# Patient Record
Sex: FEMALE | Race: WHITE
[De-identification: names, ages, dates, MRNs, and addresses within clinical notes are randomized per-mention and may not be internally consistent; named-entity substitution may affect disease eponyms.]

---

## 2023-03-08 ENCOUNTER — HOSPITAL ENCOUNTER (OUTPATIENT)
Dept: HOSPITAL 97 - OR | Age: 83
Setting detail: OBSERVATION
LOS: 1 days | Discharge: HOME HEALTH | End: 2023-03-09
Attending: ORTHOPAEDIC SURGERY | Admitting: ORTHOPAEDIC SURGERY
Payer: MEDICARE

## 2023-03-08 VITALS — BODY MASS INDEX: 35.1 KG/M2

## 2023-03-08 DIAGNOSIS — M17.12: Primary | ICD-10-CM

## 2023-03-08 DIAGNOSIS — M25.562: ICD-10-CM

## 2023-03-08 DIAGNOSIS — Z20.822: ICD-10-CM

## 2023-03-08 LAB — HCT VFR BLD CALC: 45.4 % (ref 36–45)

## 2023-03-08 PROCEDURE — 97110 THERAPEUTIC EXERCISES: CPT

## 2023-03-08 PROCEDURE — 36415 COLL VENOUS BLD VENIPUNCTURE: CPT

## 2023-03-08 PROCEDURE — 88305 TISSUE EXAM BY PATHOLOGIST: CPT

## 2023-03-08 PROCEDURE — 0SRD069 REPLACEMENT OF LEFT KNEE JOINT WITH OXIDIZED ZIRCONIUM ON POLYETHYLENE SYNTHETIC SUBSTITUTE, CEMENTED, OPEN APPROACH: ICD-10-PCS

## 2023-03-08 PROCEDURE — 97161 PT EVAL LOW COMPLEX 20 MIN: CPT

## 2023-03-08 PROCEDURE — 27447 TOTAL KNEE ARTHROPLASTY: CPT

## 2023-03-08 PROCEDURE — 88311 DECALCIFY TISSUE: CPT

## 2023-03-08 PROCEDURE — 97139 UNLISTED THERAPEUTIC PX: CPT

## 2023-03-08 PROCEDURE — 85018 HEMOGLOBIN: CPT

## 2023-03-08 PROCEDURE — 94010 BREATHING CAPACITY TEST: CPT

## 2023-03-08 PROCEDURE — 97530 THERAPEUTIC ACTIVITIES: CPT

## 2023-03-08 PROCEDURE — 87811 SARS-COV-2 COVID19 W/OPTIC: CPT

## 2023-03-08 PROCEDURE — 85014 HEMATOCRIT: CPT

## 2023-03-08 PROCEDURE — 97116 GAIT TRAINING THERAPY: CPT

## 2023-03-08 PROCEDURE — 73560 X-RAY EXAM OF KNEE 1 OR 2: CPT

## 2023-03-08 RX ADMIN — TRAMADOL HYDROCHLORIDE PRN MG: 50 TABLET, COATED ORAL at 22:18

## 2023-03-08 RX ADMIN — CEFAZOLIN SCH MLS: 2 INJECTION, POWDER, FOR SOLUTION INTRAMUSCULAR; INTRAVENOUS at 22:00

## 2023-03-08 RX ADMIN — Medication SCH: at 21:00

## 2023-03-08 RX ADMIN — CEFAZOLIN SCH MLS: 2 INJECTION, POWDER, FOR SOLUTION INTRAMUSCULAR; INTRAVENOUS at 14:20

## 2023-03-08 RX ADMIN — GABAPENTIN SCH MG: 300 CAPSULE ORAL at 22:00

## 2023-03-08 NOTE — RAD REPORT
EXAM DESCRIPTION:  RAD - Knee Left 2 View - 3/8/2023 11:05 am

 

CLINICAL HISTORY:  Post Op

 

COMPARISON:  Knee Left 3 View dated 8/6/2018

 

FINDINGS:  Postoperative changes of a left total knee arthroplasty is noted. Skin staples are noted a
nteriorly. No unexpected immediate postoperative finding.

## 2023-03-08 NOTE — P.BOP
Preoperative diagnosis: left knee osteoarthritis


Postoperative diagnosis: same


Primary procedure: left total knee arthroplasty


Assistant: NONE,NONE


Estimated blood loss: 50 cc


Specimen: left knee bone remnants


Findings: see dictation


Anesthesia: General


Complications: None


Implants: [employer] Rosi Person[employer] 7 CR femur, E tibia w/ stem, 10 CR poly, 29 

patella


Fluids & blood products: per anesthesia record; TT: 62 mins @ 300 mmHg


Transferred to: Recovery Room


Condition: Good

## 2023-03-08 NOTE — XMS REPORT
Continuity of Care Document

                            Created on:2023



Patient:ISABELLE CANO

Sex:Female

:1940

External Reference #:[id number]





Demographics







                          Address                   [address] [place] [address]

 

                          Home Phone                [phone]

 

                          Mobile Phone              [phone]

 

                          Email Address             [address]

 

                          Preferred Language        English

 

                          Marital Status            Unknown

 

                          Adventism Affiliation     Unknown

 

                          Race                      Unknown

 

                          Additional Race(s)        Unavailable



                                                    White

 

                          Ethnic Group              Unknown









Author







                          Organization              [facility]

t

 

                          Address                   [address] [place] [address] [address]



                                                    [place] [address]

 

                          Phone                     [phone]









Support







                Name            Relationship    Address         Phone

 

                Jacquelyn Kong Child           Unavailable     [phone]

[phone]

 

                Mansi CRUM  Grandchild      Unavailable     [phone]









Care Team Providers







                    Name                Role                Phone

 

                    LOLIS FU Primary Care Physician Unavailable

 

                    Iyanoye_S           Attending Clinician Unavailable

 

                    Osmin_EVAN          Attending Clinician Unavailable

 

                    KEYANA GAONA   Attending Clinician Unavailable

 

                    Keyana Gaona MD Attending Clinician [phone]

 

                    Jessica Bennett   Attending Clinician [phone]

 

                    CAMILLA KLEIN     Attending Clinician Unavailable

 

                    Keisha Herrera  Attending Clinician [phone]

 

                    Car Sharma MD Attending Clinician [phone]

 

                    CAR SHARMA   Attending Clinician Unavailable

 

                    Doctor Unassigned, No Name Attending Clinician Unavailable

 

                    KEISHA LESLIE      Attending Clinician Unavailable

 

                    Iyanoye_S           Admitting Clinician Unavailable

 

                    Andreas          Admitting Clinician Unavailable

 

                    KEYANA GAONA   Admitting Clinician Unavailable

 

                    CAR SHARMA   Admitting Clinician Unavailable









Payers







           Payer Name Policy Type Policy Number Effective Date Expiration Date S

vince

 

           [facility] hospital            [id number]     2022            



           (MEDICARE                        00:00:00              



           REPLACEMENT HMO)                                             

 

           [facility] Move In History            ZKY     2022            



           MEDICARE ADVANTAGE                       00:00:00              



           PLAN                                                   

 

           [employer] MEDICARE ADV            [id number] 2020            



                                            00:00:00              







Problems







       Condition Condition Condition Status Onset  Resolution Last   Treating Co

mments 

Source



       Name   Details Category        Date   Date   Treatment Clinician        



                                                 Date                 

 

       Low back Low back Disease Active                              Unive

rs



       pain   pain                 4-12                               ity of



                                   00:00:                             [address]







Allergies, Adverse Reactions, Alerts







       Allergy Allergy Status Severity Reaction(s) Onset  Inactive Treating Comm

ents 

Source



       Name   Type                        Date   Date   Clinician        

 

       NO KNOWN Drug   Active                                           [facility]



       ALLERGIE Class                                                   ity of



       S                                                              [facility]







Social History







           Social Habit Start Date Stop Date  Quantity   Comments   Source

 

           Exposure to 2022-10-23 2022 Not sure              [facility]-CoV-2 00:00:00   13:38:00                         [facility]



           (event)                                                Fine

 

           Alcohol intake 2022 0 /d                  [facility]



                      00:00:00   00:00:00                         [facility]

 

           Tobacco use and 2017 Smokeless tobacco            Un

iversity of



           exposure   00:00:00   00:00:00   non-user              [facility]

 

           Sex Assigned At 1940                       [facility]

y of



           Birth      00:00:00   00:00:00                         [facility]









                Smoking Status  Start Date      Stop Date       Source

 

                Never smoked tobacco                                 [facility]







Medications







       Ordered Filled Start  Stop   Current Ordering Indication Dosage Frequency

 Signature

                    Comments            Components          Source



     Medication Medication Date Date Medication? Clinician                (SIG) 

          



     Name Name                                                   

 

     iopamidol      2022- No        [id number] 74mL      74 mL,           U

nivers



     (ISOVUE                                Intravenou           ity o

f



     370-500 mL)      21:00: 21:00                          s, ONCE, 1          

 [place]



     injection      00   :00                           dose, On           Medica

l



     74 mL                                         Wed            [facility]



                                                  22 at           



                                                  1600,           



                                                  Routine           

 

     NaCl 0.9%      2022- No             1000mL      at 999           Uni

vers



     (NS) bolus                                mL/hr,           ity of



     infusion      19:45: 21:01                          1,000 mL,           [facility]

as



     1,000 mL      00   :00                           IV             Medical



                                                  Infusion,           [facility]



                                                  ONCE, 1           



                                                  dose, On           



                                                  Wed            



                                                  22 at           



                                                  1445, ASAP           

 

     allopurinol      2019      Yes                      TK 1 T PO           U

nivers



     100 mg      1-05                               D FOR HIGH           ity of



     tablet      00:00:                               URIC ACID           [address]

 

     allopurinol      2019      Yes                      TK 1 T PO           U

nivers



     100 mg      1-05                               D FOR HIGH           ity of



     tablet      00:00:                               URIC ACID           [place]



                                                [facility]

 

     allopurinol      2019      Yes                      TK 1 T PO           U

nivers



     100 mg      1-05                               D FOR HIGH           ity of



     tablet      00:00:                               URIC ACID           [place]



               00                                 [facility]

 

     allopurinol      2019      Yes                      TK 1 T PO           U

nivers



     100 mg      1-05                               D FOR HIGH           ity of



     tablet      00:00:                               URIC ACID           [place]



                                                [facility]

 

     allopurinol      2019      Yes                      TK 1 T PO           U

nivers



     100 mg      1-05                               D FOR HIGH           ity of



     tablet      00:00:                               URIC ACID           [place]



                                                [facility]

 

     allopurinol      2019      Yes                      TK 1 T PO           U

nivers



     100 mg      1-05                               D FOR HIGH           ity of



     tablet      00:00:                               URIC ACID           [place]



                                                [facility]

 

     allopurinol      2019      Yes                      TK 1 T PO           U

nivers



     100 mg      1-05                               D FOR HIGH           ity of



     tablet      00:00:                               URIC ACID           [place]



                                                [facility]

 

     allopurinol      2019      Yes                      TK 1 T PO           U

nivers



     100 mg      1-05                               D FOR HIGH           ity of



     tablet      00:00:                               URIC ACID           [place]



                                                [facility]

 

     allopurinol      2019      Yes                      TK 1 T PO           U

nivers



     100 mg      1-05                               D FOR HIGH           ity of



     tablet      00:00:                               URIC ACID           [place]



                                                [facility]

 

     Pantoprazol            Yes            40mg      Take 40 mg           

Univers



     e 40 mg      7-17                               by mouth           ity of



     delayed-rel      19:34:                               daily.           [place]

s



     ease      40                                                Medical



     suspension                                                        [facility]

 

     gabapentin            Yes                      Take by           [facility]

ers



      mg      7-17                               mouth           ity of



     tablet,      19:34:                               daily.           [place]



     extended      40                                                Medical



     release 24                                                        [facility]



     hr                                                          

 

     Pantoprazol            Yes            40mg      Take 40 mg           

[facility]



     e 40 mg      7-17                               by mouth           ity of



     delayed-rel      19:34:                               daily.           [place]

s



     ease      40                                                Medical



     suspension                                                        [facility]

 

     gabapentin            Yes                      Take by           [facility]

ers



      mg      7-17                               mouth           ity of



     tablet,      19:34:                               daily.           [place]



     extended      40                                                Medical



     release 24                                                        [facility]



     hr                                                          

 

     Pantoprazol            Yes            40mg      Take 40 mg           

[facility]



     e 40 mg      7-17                               by mouth           ity of



     delayed-rel      19:34:                               daily.           [facility]

s



     ease      40                                                Medical



     suspension                                                        [facility]

 

     gabapentin            Yes                      Take by           [facility]

ers



      mg      7-17                               mouth           ity of



     tablet,      19:34:                               daily.           [place]



     extended      40                                                Medical



     release 24                                                        [facility]



     hr                                                          

 

     Pantoprazol            Yes            40mg      Take 40 mg           

[facility]



     e 40 mg      7-17                               by mouth           ity of



     delayed-rel      19:34:                               daily.           [place]

s



     ease      40                                                Medical



     suspension                                                        [facility]

 

     gabapentin            Yes                      Take by           [facility]

ers



      mg      7-17                               mouth           ity of



     tablet,      19:34:                               daily.           [place]



     extended      40                                                Medical



     release 24                                                        [facility]



     hr                                                          

 

     Pantoprazol            Yes            40mg      Take 40 mg           

[facility]



     e 40 mg      7-17                               by mouth           ity of



     delayed-rel      19:34:                               daily.           [place]

s



     ease      40                                                Medical



     suspension                                                        [facility]

 

     gabapentin            Yes                      Take by           Univ

ers



      mg      7-17                               mouth           ity of



     tablet,      19:34:                               daily.           [place]



     extended      40                                                Medical



     release 24                                                        [facility]



     hr                                                          

 

     Pantoprazol            Yes            40mg      Take 40 mg           

[facility]



     e 40 mg      7-17                               by mouth           ity of



     delayed-rel      19:34:                               daily.           [place]

s



     ease      40                                                Medical



     suspension                                                        [facility]

 

     gabapentin            Yes                      Take by           Univ

ers



      mg      7-17                               mouth           ity of



     tablet,      19:34:                               daily.           [place]



     extended      40                                                Medical



     release 24                                                        [facility]



     hr                                                          

 

     Pantoprazol            Yes            40mg      Take 40 mg           

[facility]



     e 40 mg      7-17                               by mouth           ity of



     delayed-rel      19:34:                               daily.           [place]

s



     ease      40                                                Medical



     suspension                                                        [facility]

 

     gabapentin            Yes                      Take by           Univ

ers



      mg      7-17                               mouth           ity of



     tablet,      19:34:                               daily.           [place]



     extended      40                                                Medical



     release 24                                                        [facility]



     hr                                                          

 

     Pantoprazol            Yes            40mg      Take 40 mg           

[facility]



     e 40 mg      7-17                               by mouth           ity of



     delayed-rel      19:34:                               daily.           [place]

s



     ease      40                                                Medical



     suspension                                                        [facility]

 

     gabapentin            Yes                      Take by           Univ

ers



      mg      7-17                               mouth           ity of



     tablet,      19:34:                               daily.           [place]



     extended      40                                                Medical



     release 24                                                        [facility]



     hr                                                          

 

     Pantoprazol            Yes            40mg      Take 40 mg           

[facility]



     e 40 mg      7-17                               by mouth           ity of



     delayed-rel      19:34:                               daily.           [place]

s



     ease      40                                                Medical



     suspension                                                        [facility]

 

     gabapentin            Yes                      Take by           Univ

ers



      mg      7-17                               mouth           ity of



     tablet,      19:34:                               daily.           [place]



     extended      40                                                Medical



     release 24                                                        [facility]



     hr                                                          

 

     Pantoprazol            Yes            40mg      Take 40 mg           

[facility]



     e 40 mg      7-17                               by mouth           ity of



     delayed-rel      14:34:                               daily.           [place]

s



     ease      40                                                Medical



     suspension                                                        [facility]

 

     gabapentin            Yes                      Take by           Univ

ers



      mg      7-17                               mouth           ity of



     tablet,      14:34:                               daily.           [place]



     extended      40                                                Medical



     release 24                                                        [facility]



     hr                                                          

 

     Brimonidine      2018-0      Yes            1[drp]      Place 1           U

nivers



     -Timolol      8-28                               Drop in           ity of



     (COMBIGAN)      15:14:                               each eye 2           T

[facility]



     0.2-0.5 %      39                                 (two)           Medical



     ophthalmic                                         times           [facility]



     drops                                         daily.           

 

     Brimonidine      2018-0      Yes            1[drp]      Place 1           U

nivers



     -Timolol      8-28                               Drop in           ity of



     (COMBIGAN)      15:14:                               each eye 2           T

[facility]



     0.2-0.5 %      39                                 (two)           Medical



     ophthalmic                                         times           [facility]



     drops                                         daily.           

 

     Brimonidine      2018-0      Yes            1[drp]      Place 1           U

nivers



     -Timolol      8-28                               Drop in           ity of



     (COMBIGAN)      15:14:                               each eye 2           T

[place]



     0.2-0.5 %      39                                 (two)           Medical



     ophthalmic                                         times           [facility]



     drops                                         daily.           

 

     Brimonidine      -0      Yes            1[drp]      Place 1           U

nivers



     -Timolol      8-28                               Drop in           ity of



     (COMBIGAN)      15:14:                               each eye 2           T

[place]



     0.2-0.5 %      39                                 (two)           Medical



     ophthalmic                                         times           [facility]



     drops                                         daily.           

 

     Brimonidine      -0      Yes            1[drp]      Place 1           U

nivers



     -Timolol      8-28                               Drop in           ity of



     (COMBIGAN)      15:14:                               each eye 2           T

[place]



     0.2-0.5 %      39                                 (two)           Medical



     ophthalmic                                         times           [facility]



     drops                                         daily.           

 

     Brimonidine      -0      Yes            1[drp]      Place 1           U

nivers



     -Timolol      8-28                               Drop in           ity of



     (COMBIGAN)      15:14:                               each eye 2           T

[facility]



     0.2-0.5 %      39                                 (two)           Medical



     ophthalmic                                         times           [facility]



     drops                                         daily.           

 

     Brimonidine      2018-0      Yes            1[drp]      Place 1           U

nivers



     -Timolol      8-28                               Drop in           ity of



     (COMBIGAN)      15:14:                               each eye 2           T

[facility]



     0.2-0.5 %      39                                 (two)           Medical



     ophthalmic                                         times           [facility]



     drops                                         daily.           

 

     Brimonidine      2018-0      Yes            1[drp]      Place 1           U

nivers



     -Timolol      8-28                               Drop in           ity of



     (COMBIGAN)      15:14:                               each eye 2           T

[facility]



     0.2-0.5 %      39                                 (two)           Medical



     ophthalmic                                         times           [facility]



     drops                                         daily.           

 

     Brimonidine      2018-0      Yes            1[drp]      Place 1           U

nivers



     -Timolol      8-28                               Drop in           ity of



     (COMBIGAN)      15:14:                               each eye 2           T

[facility]



     0.2-0.5 %      39                                 (two)           Medical



     ophthalmic                                         times           [facility]



     drops                                         daily.           

 

     Brimonidine      2018-0      Yes            1[drp]      Place 1           U

nivers



     -Timolol      8-28                               Drop in           ity of



     (COMBIGAN)      10:14:                               each eye 2           T

[facility]as



     0.2-0.5 %      39                                 (two)           Medical



     ophthalmic                                         times           [facility]



     drops                                         daily.           

 

     methylPREDN      2018-0      Yes            84mg      Take 21           Uni

vers



     ISolone      8-28                               tablets by           ity of



     (MEDROL,      00:00:                               mouth           [place]



     JULIUS,) 4 mg      00                                 SEE-INSTRU           Med

ical



     tablets                                         CTIONS.           [facility]



                                                  follow           



                                                  package           



                                                  directions           

 

     methylPREDN      2018-0      Yes            84mg      Take 21           [facility]

vers



     ISolone      8-28                               tablets by           ity of



     (MEDROL,      00:00:                               mouth           [place]



     JULIUS,) 4 mg      00                                 SEE-INSTRU           Med

ical



     tablets                                         CTIONS.           [facility]



                                                  follow           



                                                  package           



                                                  directions           

 

     methylPREDN      2018-0      Yes            84mg      Take 21           [facility]

vers



     ISolone      8-28                               tablets by           ity of



     (MEDROL,      00:00:                               mouth           [place]



     JULIUS,) 4 mg      00                                 SEE-INSTRU           Med

ical



     tablets                                         CTIONS.           [facility]



                                                  follow           



                                                  package           



                                                  directions           

 

     methylPREDN      2018-0      Yes            84mg      Take 21           [facility]

vers



     ISolone      8-28                               tablets by           ity of



     (MEDROL,      00:00:                               mouth           [place]



     JULIUS,) 4 mg      00                                 SEE-INSTRU           Med

ical



     tablets                                         CTIONS.           [facility]



                                                  follow           



                                                  package           



                                                  directions           

 

     methylPREDN      2018-0      Yes            84mg      Take 21           [facility]

vers



     ISolone      8-28                               tablets by           ity of



     (MEDROL,      00:00:                               mouth           [place]



     JULIUS,) 4 mg      00                                 SEE-INSTRU           Med

ical



     tablets                                         CTIONS.           [facility]



                                                  follow           



                                                  package           



                                                  directions           

 

     methylPREDN      2018-0      Yes            84mg      Take 21           [facility]

vers



     ISolone      8-28                               tablets by           ity of



     (MEDROL,      00:00:                               mouth           [place]



     JULIUS,) 4 mg      00                                 SEE-INSTRU           Med

ical



     tablets                                         CTIONS.           [facility]



                                                  follow           



                                                  package           



                                                  directions           

 

     methylPREDN      2018-0      Yes            84mg      Take 21           [facility]

vers



     ISolone      8-28                               tablets by           ity of



     (MEDROL,      00:00:                               mouth           [place]



     JULIUS,) 4 mg      00                                 SEE-INSTRU           Med

ical



     tablets                                         CTIONS.           [facility]



                                                  follow           



                                                  package           



                                                  directions           

 

     methylPREDN      2018-0      Yes            84mg      Take 21           [facility]

vers



     ISolone      8-28                               tablets by           ity of



     (MEDROL,      00:00:                               mouth           [place]



     JULIUS,) 4 mg      00                                 SEE-INSTRU           Med

ical



     tablets                                         CTIONS.           [facility]



                                                  follow           



                                                  package           



                                                  directions           

 

     methylPREDN      2018-0      Yes            84mg      Take 21           [facility]

vers



     ISolone      8-28                               tablets by           ity of



     (MEDROL,      00:00:                               mouth           [place]



     JULIUS,) 4 mg      00                                 SEE-INSTRU           Med

ical



     tablets                                         CTIONS.           [facility]



                                                  follow           



                                                  package           



                                                  directions           

 

     methylPREDN      2018-0      Yes            84mg      Take 21           [facility]

vers



     ISolone      8-28                               tablets by           ity of



     (MEDROL,      00:00:                               mouth           [place]



     JULIUS,) 4 mg      00                                 SEE-INSTRU           Med

ical



     tablets                                         CTIONS.           [facility]



                                                  follow           



                                                  package           



                                                  directions           

 

     aspirin 81      2018-0      Yes            81mg      Take 81 mg           U

nivers



     mg chewable      6-04                               by mouth           ity 

of



     tablet      15:51:                               daily.           [address]

 

     aspirin 81      2018-0      Yes            81mg      Take 81 mg           U

nivers



     mg chewable      6-04                               by mouth           ity 

of



     tablet      15:51:                               daily.           [address]

 

     aspirin 81      2018-0      Yes            81mg      Take 81 mg           U

nivers



     mg chewable      6-04                               by mouth           ity 

of



     tablet      15:51:                               daily.           [address]

 

     aspirin 81      2018-0      Yes            81mg      Take 81 mg           U

nivers



     mg chewable      6-04                               by mouth           ity 

of



     tablet      15:51:                               daily.           [address]

 

     aspirin 81      2018-0      Yes            81mg      Take 81 mg           U

nivers



     mg chewable      6-04                               by mouth           ity 

of



     tablet      15:51:                               daily.           [address]

 

     aspirin 81      2018-0      Yes            81mg      Take 81 mg           U

nivers



     mg chewable      6-04                               by mouth           ity 

of



     tablet      15:51:                               daily.           [address]

 

     aspirin 81      2018-0      Yes            81mg      Take 81 mg           U

nivers



     mg chewable      6-04                               by mouth           ity 

of



     tablet      15:51:                               daily.           [address]

 

     aspirin 81      2018-0      Yes            81mg      Take 81 mg           [facility]

nivers



     mg chewable      6-04                               by mouth           ity 

of



     tablet      15:51:                               daily.           [address]

 

     aspirin 81      2018-0      Yes            81mg      Take 81 mg           U

nivers



     mg chewable      6-04                               by mouth           ity 

of



     tablet      15:51:                               daily.           [address]

 

     aspirin 81      2018-0      Yes            81mg      Take 81 mg           U

nivers



     mg chewable      6-04                               by mouth           ity 

of



     tablet      10:51:                               daily.           [place]



               32                                                Medical



                                                                 [facility]

 

     diclofenac      2017-      Yes            75mg      Take 1           [facility]

rs



     75 mg EC      2-20                               tablet by           [facility] of



     tablet      00:00:                               mouth 2           [place]



               00                                 (two)           Medical



                                                  times           [facility]



                                                  daily with           



                                                  meals.           

 

     diclofenac      -      Yes            75mg      Take 1           [facility]

rs



     75 mg EC      2-20                               tablet by           ity of



     tablet      00:00:                               mouth 2           [place]



               00                                 (two)           Medical



                                                  times           [facility]



                                                  daily with           



                                                  meals.           

 

     diclofenac      2017      Yes            75mg      Take 1           [facility]

rs



     75 mg EC      2-20                               tablet by           ity of



     tablet      00:00:                               mouth 2           [place]



               00                                 (two)           Medical



                                                  times           [facility]



                                                  daily with           



                                                  meals.           

 

     diclofenac      -      Yes            75mg      Take 1           [facility]

rs



     75 mg EC      2-20                               tablet by           ity of



     tablet      00:00:                               mouth 2           [place]



               00                                 (two)           Medical



                                                  times           [facility]



                                                  daily with           



                                                  meals.           

 

     diclofenac      2017      Yes            75mg      Take 1           [facility]

rs



     75 mg EC      2-20                               tablet by           ity of



     tablet      00:00:                               mouth 2           [place]



               00                                 (two)           Medical



                                                  times           [facility]



                                                  daily with           



                                                  meals.           

 

     diclofenac      2017      Yes            75mg      Take 1           [facility]

rs



     75 mg EC      2-20                               tablet by           ity of



     tablet      00:00:                               mouth 2           [place]



               00                                 (two)           Medical



                                                  times           [facility]



                                                  daily with           



                                                  meals.           

 

     diclofenac      2017      Yes            75mg      Take 1           [facility]

rs



     75 mg EC      2-20                               tablet by           ity of



     tablet      00:00:                               mouth 2           [place]



               00                                 (two)           Medical



                                                  times           [facility]



                                                  daily with           



                                                  meals.           

 

     diclofenac      2017      Yes            75mg      Take 1           [facility]

rs



     75 mg EC      2-20                               tablet by           ity of



     tablet      00:00:                               mouth 2           [place]



               00                                 (two)           Medical



                                                  times           [facility]



                                                  daily with           



                                                  meals.           

 

     diclofenac      2017      Yes            75mg      Take 1           [facility]

rs



     75 mg EC      2-20                               tablet by           ity of



     tablet      00:00:                               mouth 2           [place]



               00                                 (two)           Medical



                                                  times           [facility]



                                                  daily with           



                                                  meals.           

 

     diclofenac      -      Yes            75mg      Take 1           [facility]

rs



     75 mg EC      2-20                               tablet by           ity of



     tablet      00:00:                               mouth 2           [place]



               00                                 (two)           Medical



                                                  times           [facility]



                                                  daily with           



                                                  meals.           

 

     DICLOFENAC      -      Yes                      TAKE 1           [facility]

rs



     75 mg EC      0-30                               TABLET BY           ity of



     tablet      00:00:                               MOUTH           [place]



               00                                 TWICE A           Medical



                                                  DAY WITH           [facility]



                                                  MEALS           

 

     DICLOFENAC      2017      Yes                      TAKE 1           [facility]

rs



     75 mg EC      0-30                               TABLET BY           ity of



     tablet      00:00:                               MOUTH           [place]



               00                                 TWICE A           Medical



                                                  DAY WITH           [facility]



                                                  MEALS           

 

     DICLOFENAC      2017-      Yes                      TAKE 1           [facility]

rs



     75 mg EC      0-30                               TABLET BY           ity of



     tablet      00:00:                               MOUTH           [place]



               00                                 TWICE A           Medical



                                                  DAY WITH           [facility]



                                                  MEALS           

 

     DICLOFENAC      2017-      Yes                      TAKE 1           [facility]

rs



     75 mg EC      0-30                               TABLET BY           ity of



     tablet      00:00:                               MOUTH           [place]



               00                                 TWICE A           Medical



                                                  DAY WITH           [facility]



                                                  MEALS           

 

     DICLOFENAC      2017-      Yes                      TAKE 1           [facility]

rs



     75 mg EC      0-30                               TABLET BY           ity of



     tablet      00:00:                               MOUTH           [place]



               00                                 TWICE A           Medical



                                                  DAY WITH           [facility]



                                                  MEALS           

 

     DICLOFENAC      2017-      Yes                      TAKE 1           [facility]

rs



     75 mg EC      0-30                               TABLET BY           ity of



     tablet      00:00:                               MOUTH           [place]



               00                                 TWICE A           Medical



                                                  DAY WITH           [facility]



                                                  MEALS           

 

     DICLOFENAC      2017-      Yes                      TAKE 1           [facility]

rs



     75 mg EC      0-30                               TABLET BY           ity of



     tablet      00:00:                               MOUTH           [place]



               00                                 TWICE A           Medical



                                                  DAY WITH           [facility]



                                                  MEALS           

 

     DICLOFENAC      2017-      Yes                      TAKE 1           [facility]

rs



     75 mg EC      0-30                               TABLET BY           ity of



     tablet      00:00:                               MOUTH           [place]



               00                                 TWICE A           Medical



                                                  DAY WITH           [facility]



                                                  MEALS           

 

     DICLOFENAC      2017-      Yes                      TAKE 1           [facility]

rs



     75 mg EC      0-30                               TABLET BY           ity of



     tablet      00:00:                               MOUTH           [place]



               00                                 TWICE A           Medical



                                                  DAY WITH           [facility]



                                                  MEALS           

 

     DICLOFENAC      2017-      Yes                      TAKE 1           [facility]

rs



     75 mg EC      0-30                               TABLET BY           ity of



     tablet      00:00:                               MOUTH           [place]



               00                                 TWICE A           Medical



                                                  DAY WITH           [facility]



                                                  MEALS           

 

     simvastatin      2017-0      Yes            20mg      Take 20 mg           

[facility]



     20 mg      2-13                               by mouth           ity of



     tablet      00:00:                               every           [place]



               00                                 evening.           [facility]

 

     simvastatin      2017-0      Yes            20mg      Take 20 mg           

[facility]



     20 mg      2-13                               by mouth           ity of



     tablet      00:00:                               every           [place]



               00                                 evening.           [facility]

 

     simvastatin      2017-0      Yes            20mg      Take 20 mg           

[facility]



     20 mg      2-13                               by mouth           ity of



     tablet      00:00:                               every           [place]



               00                                 evening.           [facility]

 

     simvastatin      2017-0      Yes            20mg      Take 20 mg           

[facility]



     20 mg      2-13                               by mouth           ity of



     tablet      00:00:                               every           [place]



               00                                 evening.           [facility]

 

     simvastatin      2017-0      Yes            20mg      Take 20 mg           

[facility]



     20 mg      2-13                               by mouth           ity of



     tablet      00:00:                               every           [place]



               00                                 evening.           [facility]

 

     simvastatin      2017-0      Yes            20mg      Take 20 mg           

[facility]



     20 mg      2-13                               by mouth           ity of



     tablet      00:00:                               every           [place]



               00                                 evening.           [facility]

 

     simvastatin      2017-0      Yes            20mg      Take 20 mg           

[facility]



     20 mg      2-13                               by mouth           ity of



     tablet      00:00:                               every           [place]



               00                                 evening.           Medical



                                                                 [facility]

 

     simvastatin      2017-0      Yes            20mg      Take 20 mg           

[facility]



     20 mg      2-13                               by mouth           ity of



     tablet      00:00:                               every           [place]



                                                evening.           Medical



                                                                 [facility]

 

     simvastatin      2017-0      Yes            20mg      Take 20 mg           

[facility]



     20 mg      2-13                               by mouth           ity of



     tablet      00:00:                               every           [place]



                                                evening.           [facility]



                                                                 [facility]

 

     simvastatin      2017-0      Yes            20mg      Take 20 mg           

[facility]



     20 mg      2-13                               by mouth           ity of



     tablet      00:00:                               every           [place]



                                                evening.           [facility]



                                                                 Branch

 

     lisinopril      2017-0      Yes            20mg      Take 20 mg           U

nivers



     20 mg      2-02                               by mouth           ity of



     tablet      00:00:                               daily.           [place]



                                                               [facility]

 

     hydroCHLORO      2017-0      Yes            25mg      Take 25 mg           

[facility]



     thiazide 25      2-02                               by mouth           ity 

of



     mg tablet      00:00:                               daily.           [place]



               00                                                [facility]

 

     lisinopril      2017-0      Yes            20mg      Take 20 mg           U

nivers



     20 mg      2-02                               by mouth           ity of



     tablet      00:00:                               daily.           [place]



               00                                                [facility]

 

     hydroCHLORO      2017-0      Yes            25mg      Take 25 mg           

Univers



     thiazide 25      2-02                               by mouth           ity 

of



     mg tablet      00:00:                               daily.           [place]



               00                                                [facility]

 

     lisinopril      2017-0      Yes            20mg      Take 20 mg           U

nivers



     20 mg      2-02                               by mouth           ity of



     tablet      00:00:                               daily.           [place]



               00                                                [facility]

 

     hydroCHLORO      2017-0      Yes            25mg      Take 25 mg           

Univers



     thiazide 25      2-02                               by mouth           ity 

of



     mg tablet      00:00:                               daily.           [place]



               00                                                [facility]

 

     lisinopril      2017-0      Yes            20mg      Take 20 mg           U

nivers



     20 mg      2-02                               by mouth           ity of



     tablet      00:00:                               daily.           [place]



               00                                                [facility]

 

     hydroCHLORO      2017-0      Yes            25mg      Take 25 mg           

Univers



     thiazide 25      2-02                               by mouth           ity 

of



     mg tablet      00:00:                               daily.           [place]



               00                                                [facility]

 

     lisinopril      2017-0      Yes            20mg      Take 20 mg           U

nivers



     20 mg      2-02                               by mouth           ity of



     tablet      00:00:                               daily.           [place]



               00                                                [facility]

 

     hydroCHLORO      2017-0      Yes            25mg      Take 25 mg           

Univers



     thiazide 25      2-02                               by mouth           ity 

of



     mg tablet      00:00:                               daily.           [place]



               00                                                [facility]

 

     lisinopril      2017-0      Yes            20mg      Take 20 mg           U

nivers



     20 mg      2-02                               by mouth           ity of



     tablet      00:00:                               daily.           [place]



                                                               [facility]

 

     hydroCHLORO      2017-0      Yes            25mg      Take 25 mg           

Univers



     thiazide 25      2-02                               by mouth           ity 

of



     mg tablet      00:00:                               daily.           [place]



                                                               [facility]

 

     lisinopril      2017-0      Yes            20mg      Take 20 mg           U

nivers



     20 mg      2-02                               by mouth           ity of



     tablet      00:00:                               daily.           [place]



                                                               [facility]

 

     hydroCHLORO      2017-0      Yes            25mg      Take 25 mg           

Univers



     thiazide 25      2-02                               by mouth           ity 

of



     mg tablet      00:00:                               daily.           [place]



                                                               [facility]

 

     lisinopril      2017-0      Yes            20mg      Take 20 mg           U

nivers



     20 mg      2-02                               by mouth           ity of



     tablet      00:00:                               daily.           [place]



                                                               [facility]

 

     hydroCHLORO      2017-0      Yes            25mg      Take 25 mg           

Univers



     thiazide 25      2-02                               by mouth           ity 

of



     mg tablet      00:00:                               daily.           [place]



                                                               [facility]

 

     lisinopril      2017-0      Yes            20mg      Take 20 mg           U

nivers



     20 mg      2-02                               by mouth           ity of



     tablet      00:00:                               daily.           [place]



                                                               [facility]

 

     hydroCHLORO      2017-0      Yes            25mg      Take 25 mg           

Univers



     thiazide 25      2-02                               by mouth           ity 

of



     mg tablet      00:00:                               daily.           [place]



                                                               [facility]

 

     lisinopril      2017-0      Yes            20mg      Take 20 mg           U

nivers



     20 mg      2-02                               by mouth           ity of



     tablet      00:00:                               daily.           [place]



                                                               [facility]

 

     hydroCHLORO      2017-0      Yes            25mg      Take 25 mg           

Univers



     thiazide 25      2-02                               by mouth           ity 

of



     mg tablet      00:00:                               daily.           [address]

 

     amLODIPine      2017-0      Yes            5mg       Take 5 mg           Un

lorne



     5 mg tablet      1-23                               by mouth           ity 

of



               00:00:                               daily.           [place]



                                                               [facility]

 

     amLODIPine      2017-0      Yes            5mg       Take 5 mg           Un

lorne



     5 mg tablet      1-23                               by mouth           ity 

of



               00:00:                               daily.           [place]



                                                               [facility]

 

     amLODIPine      2017-0      Yes            5mg       Take 5 mg           Un

lorne



     5 mg tablet      1-23                               by mouth           ity 

of



               00:00:                               daily.           [address]

 

     amLODIPine      2017-0      Yes            5mg       Take 5 mg           Un

lorne



     5 mg tablet      1-23                               by mouth           ity 

of



               00:00:                               daily.           [place]



                                                               [facility]

 

     amLODIPine      2017-0      Yes            5mg       Take 5 mg           Un

lorne



     5 mg tablet      1-23                               by mouth           ity 

of



               00:00:                               daily.           [address]

 

     amLODIPine      2017-0      Yes            5mg       Take 5 mg           Un

lorne



     5 mg tablet      1-23                               by mouth           ity 

of



               00:00:                               daily.           [place]



                                                               [facility]

 

     amLODIPine      2017-0      Yes            5mg       Take 5 mg           Un

lorne



     5 mg tablet      1-23                               by mouth           ity 

of



               00:00:                               daily.           [address]

 

     amLODIPine      2017-0      Yes            5mg       Take 5 mg           Un

lorne



     5 mg tablet      1-23                               by mouth           ity 

of



               00:00:                               daily.           [place]



                                                               [facility]

 

     amLODIPine      2017-0      Yes            5mg       Take 5 mg           Un

lorne



     5 mg tablet      1-23                               by mouth           ity 

of



               00:00:                               daily.           [address]

 

     amLODIPine      2017-0      Yes            5mg       Take 5 mg           Un

lorne



     5 mg tablet      1-23                               by mouth           ity 

of



               00:00:                               daily.           [address]







Immunizations







           Ordered    Filled Immunization Date       Status     Comments   [facility]

e



           Immunization Name Name                                        

 

           SARS-COV-2 COVID-19            2021 Completed             Unive

rsity of



           PFIZER VACCINE            00:00:00                         [facility]

 

           SARS-COV-2 COVID-19            2021-02-10 Completed             Unive

rsity of



           PFIZER VACCINE            00:00:00                         [place] [facility]

alexys



                                                                  Branch







Vital Signs







             Vital Name   Observation Time Observation Value Comments     Source

 

             Systolic blood 2022 21:31:00 130 mm[Hg]                Univer

sity of



             pressure                                            [facility]

 

             Diastolic blood 2022 21:31:00 76 mm[Hg]                 Unive

rsity of



             pressure                                            [facility]

 

             Heart rate   2022 21:31:00 68 /min                   [facility]

 

             Respiratory rate 2022 21:31:00 14 /min                   [facility]

 

             Oxygen saturation in 2022 21:31:00 96 /min                   

[facility]



             Arterial blood by                                        [place] [facility]

alexys



             Pulse oximetry                                        Fine

 

             Body temperature 2022 18:41:00 35.39 Fatmata                 [facility]

 

             Body height  2022 18:41:00 157.5 cm                  [facility]

 

             Body weight  2022 18:41:00 90.719 kg                 [facility]

 

             BMI          2022 18:41:00 36.58 kg/m2               [facility]

 

             Systolic blood 2021 20:33:00 123 mm[Hg]                Univer

sity of



             pressure                                            [facility]

 

             Diastolic blood 2021 20:33:00 81 mm[Hg]                 Unive

rsity of



             [facility]

 

             Body height  2021 20:33:00 157.5 cm                  [facility]

[facility]

 

             Body weight  2021 20:33:00 81.647 kg                 [facility]

 

             BMI          2021 20:33:00 32.92 kg/m2               [facility]

ty [facility]

 

             Systolic blood 2020 18:52:00 128 mm[Hg]                Univer

sity of



             [facility]

 

             Diastolic blood 2020 18:52:00 74 mm[Hg]                 Unive

rsity of



             [facility]

 

             Heart rate   2020 18:52:00 82 /min                   [facility]

 

             Body temperature 2020 18:52:00 36.67 Fatmata                 Univ

[facility]

 

             Respiratory rate 2020 18:52:00 18 /min                   [facility]

[facility]

 

             Systolic blood 2020 18:52:00 128 mm[Hg]                Univer

sity of



             [facility]

 

             Diastolic blood 2020 18:52:00 74 mm[Hg]                 Unive

rsity of



             [facility]

 

             Heart rate   2020 18:52:00 82 /min                   [facility]

 

             Body temperature 2020 18:52:00 36.67 Fatmata                 [facility]

[facility]

 

             Respiratory rate 2020 18:52:00 18 /min                   [facility]







Procedures







                Procedure       Date / Time     Performing Clinician Source



                                Performed                       

 

                EKG-12 LEAD     2022 22:59:58 Keyana Gaona [facility]

 

                URINALYSIS      2022 21:32:00 Keyana Gaona [facility]

 

                CT ANGIOGRAM CHEST 2022 20:14:47 Keyana Gaona [facility]

 

                CONSENT/REFUSAL FOR 2022 20:01:22 [facility]

[facility]



                DIAGNOSIS AND TREATMENT                 Zuehl         [facility] 

Branch

 

                TROPONIN I      2022 19:18:00 Keyana Gaoan [facility]

 

                HEPATIC FUNCTION PANEL 2022 19:18:00 Keyana Gaona [facility]

[facility] [place]



                ([id number]) (ALB,T.PRO,BILI                                 Medical 

Branch



                T,BU/BC,ALT,AST,ALK PHOS)                                 

 

                BASIC METABOLIC PANEL 2022 19:18:00 Keyana Gaona [facility]

[facility] [place]



                (NA, K, CL, CO2, GLUCOSE,                                 Medica

l Branch



                BUN, CREATININE, CA)                                 

 

                CBC WITH DIFF   2022 19:18:00 Keyana Gaona [facility]

 

                XR CHEST 1 VW   2022 19:16:34 Keyana Gaona [facility]

 

                LACTIC ACID WHOLE BLOOD 2022 19:07:00 [facility]

 

                REFERRAL-       2020 06:01:00 Doctor Unassigned, [facility]



                REQUEST/RESPONSE                 [facility]         [facility] Fine

 

                EXTERNAL PROVIDER RECORDS 2020 06:01:00 Doctor Unassigned,

 [facility]



                                                Zuehl         Medical Branch

 

                EXTERNAL PROVIDER RECORDS 2020 06:01:00 Doctor Unassigned,

 [facility]



                                                Zuehl         Medical Branch

 

                REFERRAL-       2020 06:01:00 Doctor Unassigned, [facility]



                REQUEST/RESPONSE                 [facility]         [facility] Fine

 

                INSURANCE CORRESPONDENCE 2019 05:01:00 Doctor Maykel, 

[facility] Name         Medical Fine







Encounters







        Start   End     Encounter Admission Attending Care    Care    Encounter 

Source



        Date/Time Date/Time Type    Type    Clinicians Facility Department ID   

   

 

        2023 [facility]     [id number] [facility]



        00:00:00 00:00:00                                         [id number]    Medica

l



                                                                        Group

 

        2022-11-15 2022-11-15 Outpatient         Tumelson_A DEANA     DEANA     [id number]

 [facility]



        00:00:00 00:00:00                                         1115    Medica

l



                                                                        Group

 

        2022 Emergency X       JIMENEZ [facility]    ERT     [id number]

[id number] Univers



        13:37:00 18:15:00                 KEYANA elena [facility]

 

        202202 [facility]         [facility]    [id number]

[id number] Univers



        13:37:00 18:15:00                 [facility] [id number]         i

ty of



                                                [facility] [id number]         [facility]  [phone]         Medi

alexys



                                                        084             Fine

 

        2022 CAV             Jessica 2.16.840. 2.16.840.1. [facility]

[id number] [facility]



        15:30:00 16:30:00                 Bennett [id number]. [facility]



                                                [id number]         



                                                [id number]                   

 

        2022-07-15 2022-07-15 [facility]                 [facility]     [id number]

[id number] [facility]



        04:10:00 04:10:00                                         0715    Medica

l



                                                                        Group

 

        2021-10-29 2021-10-29 [facility]                 [facility]     [id number]

[id number] [facility]



        11:00:00 11:00:00                                         1029    Medica

l



                                                                        Group

 

        [facility]    [id number]

[id number] Univers



        09:50:00 09:50:00                 [facility]

 

        2021-02-10 2021-02-10 [facility]    [id number]

[id number] Univers



        10:10:00 10:10:00                 CAMILLA                             [facility]

 

        2021 Office          Keisha Leslie [facility]    [id number]

 [id number] Univers



        14:29:10 15:46:12 Visit           Car Sharma [facility]  350.1.13.10 

        ity of



                                                Surgical [id number]         Davin

as



                                                Specialti [phone]         Me

dical



                                                es      198             Fine



                                                [place]                 

 

        2021 Outpatient JESSICA SHARMA [facility]    [id number]

[id number] Univers



        14:45:00 14:45:00                 [facility]

 

        2020 Office          Zachary [facility]    [id number]

[id number] Univers



        13:48:14 14:45:19 Visit           Car R[facility]  350.1.13.10         it

y of



                                                Surgical [id number]         Davin

as



                                                Specialti [phone]         Me

dical



                                                es      198             [facility]                 

 

        2020 Office          Zachary [facility]    [id number]

[id number] 



        13:48:14 14:45:19 Visit           Car R[place]  [id number]         



                                                Surgical [id number]         



                                                Specialti [phone]         



                                                es      198             



                                                [place]                 

 

        2020 Out[facility] R       [facility]    [id number]

[id number] Univers



        14:15:00 14:15:00                 CARGERRY elena [facility]

 

        2020-06-10 2020-06-10 Outpatient R       [facility]    [id number]

[id number] Univers



        15:15:00 15:15:00                 CAR elena [facility]

 

        2020 Orders          Doctor GRECO    [id number]

[id number] Univers



        00:00:00 00:00:00 Only            Unassigned, DENISE   350.1.13.10       

  ity of



                                        [facility] [id number]         Davin

as



                                                        [phone]         [address]

 

        2020 Outpatient R       ANUJ  [facility]    [id number]

[id number] Univers



        13:45:00 13:45:00                 KEISHA                           itbillie [facility]

 

        2020 Orders          Doctor GRECO    [id number]

[id number] Univers



        00:00:00 00:00:00 Only            Unassigned, DENISE   350.1.13.10       

  ity of



                                        [facility] 4.2.7.2.686         [place]

as



                                                        [phone]         [address]

 

        2020 Orders          Doctor ANGUS [id number]

[id number] Univers



        00:00:00 00:00:00 Only            Unassigned, DENISE   350.1.13.10       

  ity of



                                        [facility] 4.2.7.2.686         [place]

as



                                                        [phone]         [address]

 

        2020 Orders          Doctor ANGUS Roca2.840.114 [id number]

83 Univers



        00:00:00 00:00:00 Only            Unassigned, DENISE   350.1.13.10       

  ity of



                                        [facility] 4.2.7.2.686         [place]

as



                                                        [phone]         [address]

 

        2019 [facility]    [id number]

[id number] Univers



        09:30:00 10:32:37                 [facility]

 

        2019 Outpatient JESSICA       [facility]    [id number]

[id number] Univers



        17:53:28 23:59:00                 [facility]

 

        2019 [facility]    [id number]

[id number] Univers



        10:30:00 11:02:58                 [facility]

 

        2019 Orders          Doctor GRECO    [id number]

[id number] Univers



        00:00:00 00:00:00 Only            Unassigned, DENISE   350.1.13.10       

  ity of



                                        [facility] [id number]         [place]

as



                                                        [phone]         [address]

 

        2019 [facility]    [id number]

[id number] Univers



        14:45:00 15:54:03                 [facility]







Results

This patient has no known results.

## 2023-03-09 VITALS — TEMPERATURE: 97.6 F | DIASTOLIC BLOOD PRESSURE: 53 MMHG | SYSTOLIC BLOOD PRESSURE: 107 MMHG

## 2023-03-09 VITALS — OXYGEN SATURATION: 94 %

## 2023-03-09 LAB — HCT VFR BLD CALC: 42.7 % (ref 36–45)

## 2023-03-09 RX ADMIN — HYDROCODONE BITARTRATE AND ACETAMINOPHEN PRN TAB: 7.5; 325 TABLET ORAL at 06:23

## 2023-03-09 RX ADMIN — Medication SCH: at 08:28

## 2023-03-09 RX ADMIN — TRAMADOL HYDROCHLORIDE PRN MG: 50 TABLET, COATED ORAL at 10:50

## 2023-03-09 RX ADMIN — HYDROCODONE BITARTRATE AND ACETAMINOPHEN PRN TAB: 7.5; 325 TABLET ORAL at 00:49

## 2023-03-09 RX ADMIN — CEFAZOLIN SCH MLS: 2 INJECTION, POWDER, FOR SOLUTION INTRAMUSCULAR; INTRAVENOUS at 06:10

## 2023-03-09 RX ADMIN — GABAPENTIN SCH MG: 300 CAPSULE ORAL at 08:28

## 2023-03-09 NOTE — P.DS
Admission Date: 03/08/23


Discharge Date: 03/09/23


Disposition: DC HOME/HOME HEALTH CARE


Discharge Condition: GOOD


Reason for Admission: left TKA


Consultations: 





none


Procedures: 





left TKA 3/8/2023


Brief History of Present Illness: 





Annie is a 89-year-old female who underwent left total knee arthroplasty on 

March 8, 2023 and was admitted to the floor in stable condition.


Hospital Course: 





Patient was admitted to the floor postoperatively in stable condition.  Physical

therapy was consulted to aid with mobilization.  Patient mobilized safely and 

was discharged in stable condition on March 9, 2023.  While in the hospital the 

patient received Lovenox for DVT prophylaxis and was discharged on Xarelto.  She

will follow-up in 2 weeks for reevaluation and staple removal.


Vital Signs/Physical Exam: 














Temp Pulse Resp BP Pulse Ox


 


 97.6 F   76   17   107/53 L  93 


 


 03/09/23 12:00  03/09/23 12:00  03/09/23 12:00  03/09/23 12:00  03/09/23 12:00








Laboratory Data at Discharge: 














Hgb  13.9 g/dL (12.0-15.0)   03/09/23  03:00    


 


Hct  42.7 % (36.0-45.0)   03/09/23  03:00    








Home Medications: 








Allopurinol 100 mg PO DAILY 02/10/23 


Amlodipine Besylate 5 mg PO DAILY 02/10/23 


Brimonidine Tartrate/Timolol [Combigan 0.2%-0.5% Eye Drops] 5 ml EACH EYE BID 

02/10/23 


Calcitonin [Miacalcin Nasal Spray*] 1 spray NS DAILY 02/10/23 


Calcium Carbonate [Calcium] 600 mg PO DAILY 02/10/23 


Furosemide [Lasix*] 20 mg PO DAILYPRN PRN 02/10/23 


Gabapentin 300 mg PO BID 02/10/23 


Lisinopril [Zestril] 20 mg PO DAILY 02/10/23 


Multivit-Min/FA/Lycopen/Lutein [Centrum Silver Tablet] 1 each PO DAILY 02/10/23 


Pantoprazole [Protonix Tab*] 40 mg PO DAILY 02/10/23 


Simvastatin [Zocor] 20 mg PO DAILY 02/10/23 


Ubidecarenone [Co Q-10] 200 mg PO DAILY 02/10/23 


glucosamine HCL [Glucosamine HCl] 1,500 mg PO DAILY 02/10/23 


Hydrocodone 7.5/APAP 325 [Norco 7.5/325 mg*] 1 tab PO Q4H PRN  tab 03/09/23 





Physician Discharge Instructions: 


Keep dressing clean, dry and intact.  Do not get dressing wet.  May be 

weightbearing as tolerated.  Begin Xarelto tomorrow, March 10, with breakfast 

and take once daily.  Use bilateral thigh-high DAVID hose for 2 weeks to aid with 

swelling.


Diet: Regular


Activity: Weight bearing as tolerated


Followup: 


Sukhjinder Keane MD [Primary Care Provider] - 


Oscar Nguyen MD [ACTIVE - CAN ADMIT] - 1-2 Weeks

## 2023-03-09 NOTE — P.OP
Preoperative diagnosis: left knee osteoarthritis


Postoperative diagnosis: same


Primary procedure: left total knee arthroplasty


Anesthesia: general


Estimated blood loss: 50 cc


Specimen: left knee bone remnants


Findings: see dictation


Operative Technique: 





Indication For Procedure:  Kinza is an 83 year-old female presenting to my 

clinic with signs, symptoms and x-ray findings consistent with a severe left 

knee osteoarthritis.  I discussed with the patient at length risks and benefits 

associated with operative and nonoperative treatment.  She had failed 

conservative treatment measures and had significant difficulties with ADLs 

secondary to her pain.  We discussed operative treatment and elected to proceed 

with left total knee arthroplasty.  She expressed understanding and elected to 

proceed with operative treatment.





Description Of Procedure:  After informed consent was obtained, the patient was 

identified in the preoperative holding area.  The left lower extremity was 

marked.  The patient was then taken to the PACU where he underwent a left lower 

extremity adductor canal block performed by Anesthesia.  She was then taken to 

the operating room, transferred to the operating table in supine fashion, and 

placed under general anesthesia.  Her left lower extremity was then prepped and 

draped in usual sterile fashion.  A time-out was initiated.  The correct patient

and procedure were confirmed and identified.  The patient did receive her 

preoperative prophylactic antibiotics.  The left lower extremity was then 

exsanguinated and tourniquet was inflated to 300 mmHg.  Approximately 15 cm 

longitudinal incision was made centered over the anterior aspect of the left 

knee.  Dissection was then taken to the extensor mechanism and a medial 

parapatellar arthrotomy was performed.  The patella was everted and dislocated 

laterally and the knee was flexed in the fat pad.  There were multiple 

osteophytes and heterotopic bone superior to the patella that were removed.   

Medial lateral meniscus and ACL were all excised exposing the distal femur.  

Excess hypertrophic synovium was also excised within the suprapatellar pouch.  

The patient had an MRI of the left knee preoperatively for surgical planning and

creation of cutting blocks.  The  cutting block was then placed over the distal 

femur and pins were then placed.  The distal femoral cutting block was then 

placed over the pins.  An jessica wing was then used to ensure proper depth cut 

and the distal femur was then cut.  The chamfer cutting guide was then placed 

over the distal end of the femur.  Anterior, posterior cuts as well as anterior 

and posterior chamfer cuts were then made again confirming proper depth of the 

cut using an Jessica wing.  Excess bone remnants were then sent to pathology for 

further evaluation.  Next, attention was taken to the proximal tibia.  A tibial 

jig and tibial cutting block was then placed on proximal aspect of the right 

tibia and locked into position.  Pins were then placed and alignment guide was 

then used to confirm proper alignment of the cut and then coronal and sagittal 

planes.  Once this was confirmed, the cutting jig was placed over the pins and 

the proximal tibia was cut.    Sizing trays were then selected and size 10 mm 

spacer was used and there was good overall balance in flexion and extension.  

Next, the trial implants were then placed using the size 7 standard CR femur and

a size E tibia with a 10 mm poly.  There was overall good range of motion and 

good stability and the trial implants were then removed.  The wound was then 

irrigated thoroughly with normal saline and the knee was then injected with 30 

cc of 0.5% Marcaine both in the posterior capsule and medial and lateral gutters

as well as quadriceps tendon and periosteum.  The tibia was then punched.  The 

femur was drilled.  The cement was then prepared on the back table.  Cement was 

then placed first on the tibial surface followed by size E tibia with a stem 

given her age to aid with fixation and stability.  Excess cement was removed 

with [place] elevators.  Size 7 standard CR femur was then placed on the distal 

femur after cement was placed on the distal femur.  Excess cement was then 

removed and a size 10 mm trial poly was then placed.  The knee was held in 

extension as the cement hardened.  Undersurface of the patella was prepared 

debriding osteophytes using rongeurs as well as osteophytes had been debrided 

off the proximal tibia with rongeurs and osteotomes to aid with the medial 

tightness.  Cement was placed on the undersurface of the patella after it was 

cut and a size 29 patella was placed.  Once the cement was hardened, the knee 

was ranged, there was good overall stability both in flexion, extension and as 

well as stability with varus and valgus stresses.  Trial poly was then removed 

and a size 10 mm CR poly was then placed and locked into position.  The knee was

then ranged again.  There was good overall range of motion both for flexion and 

extension with good stability. The wound was then irrigated again thoroughly 

with normal saline using pulse lavage.  Tourniquet was let down.  Hemostasis was

achieved using Bovie electrocautery.  Extensor mechanism was then approximated 

using a #1 Vicryl bothin interrupted and running fashion.  The fascia was then 

approximated using 0 Vicryl.  Subcutaneous tissue was approximated with a 2-0 

Vicryl.  Skin was approximated using staples.  Sterile dressings were applied.  

The patient was awakened and transferred back in stable condition


Complications: None


Implants: Biomet Rosi Person 7 CR femur, E tibia w/ stem, 29 patella, 10 CR 

poly


Fluids & blood products: TT: 62 mins @ 300 mmHg


Transferred to: Recovery Room


Condition: Good